# Patient Record
Sex: FEMALE | Race: WHITE | NOT HISPANIC OR LATINO
[De-identification: names, ages, dates, MRNs, and addresses within clinical notes are randomized per-mention and may not be internally consistent; named-entity substitution may affect disease eponyms.]

---

## 2020-01-21 PROBLEM — Z00.00 ENCOUNTER FOR PREVENTIVE HEALTH EXAMINATION: Status: ACTIVE | Noted: 2020-01-21

## 2020-02-04 ENCOUNTER — APPOINTMENT (OUTPATIENT)
Dept: PEDIATRIC RHEUMATOLOGY | Facility: CLINIC | Age: 20
End: 2020-02-04
Payer: COMMERCIAL

## 2020-02-04 VITALS
WEIGHT: 160.06 LBS | HEART RATE: 79 BPM | OXYGEN SATURATION: 98 % | BODY MASS INDEX: 23.98 KG/M2 | DIASTOLIC BLOOD PRESSURE: 79 MMHG | TEMPERATURE: 98.7 F | HEIGHT: 68.62 IN | SYSTOLIC BLOOD PRESSURE: 123 MMHG

## 2020-02-04 DIAGNOSIS — Z84.0 FAMILY HISTORY OF DISEASES OF THE SKIN AND SUBCUTANEOUS TISSUE: ICD-10-CM

## 2020-02-04 DIAGNOSIS — Z83.49 FAMILY HISTORY OF OTHER ENDOCRINE, NUTRITIONAL AND METABOLIC DISEASES: ICD-10-CM

## 2020-02-04 DIAGNOSIS — M25.50 PAIN IN UNSPECIFIED JOINT: ICD-10-CM

## 2020-02-04 DIAGNOSIS — Z82.61 FAMILY HISTORY OF ARTHRITIS: ICD-10-CM

## 2020-02-04 DIAGNOSIS — F39 UNSPECIFIED MOOD [AFFECTIVE] DISORDER: ICD-10-CM

## 2020-02-04 PROCEDURE — 99244 OFF/OP CNSLTJ NEW/EST MOD 40: CPT

## 2020-02-04 RX ORDER — MULTIVITAMIN
TABLET ORAL
Refills: 0 | Status: ACTIVE | COMMUNITY

## 2020-02-04 RX ORDER — NAPROXEN 500 MG/1
500 TABLET ORAL TWICE DAILY
Qty: 60 | Refills: 1 | Status: ACTIVE | COMMUNITY
Start: 2020-02-04 | End: 1900-01-01

## 2020-04-02 PROBLEM — Z84.0 FAMILY HISTORY OF PSORIASIS: Status: ACTIVE | Noted: 2020-04-02

## 2020-04-02 PROBLEM — F39 MOOD DISORDER: Status: ACTIVE | Noted: 2020-04-02

## 2020-04-02 PROBLEM — Z83.49 FAMILY HISTORY OF THYROID DISEASE: Status: ACTIVE | Noted: 2020-04-02

## 2020-04-02 PROBLEM — Z82.61 FAMILY HISTORY OF RHEUMATOID ARTHRITIS: Status: ACTIVE | Noted: 2020-04-02

## 2020-04-02 PROBLEM — M25.50 PAIN IN JOINT INVOLVING MULTIPLE SITES: Status: ACTIVE | Noted: 2020-02-04

## 2020-04-02 NOTE — PHYSICAL EXAM
[Normal] : normal [Cardiac Auscultation] : normal cardiac auscultation  [Auscultation] : lungs clear to auscultation [Grossly Intact] : grossly intact [FreeTextEntry1] : well-appearing [de-identified] : full mouth opening (crepitus on R), no evidence of arthritis, + some hypermobility (elbows hyperextensible)

## 2020-04-02 NOTE — DISCUSSION/SUMMARY
[FreeTextEntry1] : DIAGNOSIS\par \par 1) MULTIPLE JOINT PAIN\par Since 2014 \par Fingers, wrists, elbows, ankles, knees, hips, jaw - gets stuck open \par Worse during winter, usually worse with activity \par Fingers, elbows, ankles swell - 1-2x/wk, lasts a few hours\par Did PT in the past - didn't help  \par \par No evidence of arthritis on exam\par Pain likely exacerbated by poor sleep, stress\par \par PLAN\par 1. offered to order labs - Sosa wants to hold off (passes out)\par 2. start naproxen 500mg BID (instructed to take with food and avoid concurrent use of other NSAID's)\par -- recommend at least 2 week trial\par -- if no improvement noted after, can discontinue. if improvement noted, recommend taper to daily x 2 weeks, then discontinue.\par 3. reinforced the importance of good sleep hygiene\par 4. RTC as needed

## 2020-04-02 NOTE — REVIEW OF SYSTEMS
[Immunizations are up to date] : Immunizations are up to date [NI] : Endocrine [Nl] : Hematologic/Lymphatic [Wgt Gain (___ Lbs)] : recent [unfilled] lb weight gain [Skin Lesions] : skin lesions [Joint Pains] : arthralgias [Joint Swelling] : joint swelling  [Headache] : headache [FreeTextEntry1] : records maintained by [facility]

## 2020-04-02 NOTE — CONSULT LETTER
[Dear  ___] : Dear  [unfilled], [Consult Letter:] : I had the pleasure of evaluating your patient, [unfilled]. [Please see my note below.] : Please see my note below. [Consult Closing:] : Thank you very much for allowing me to participate in the care of this patient.  If you have any questions, please do not hesitate to contact me. [Sincerely,] : Sincerely, [FreeTextEntry2] : Dr. Diogo Delvalle\par [address], [address]\[place] [address]\par phone: [phone]\par fax: [phone] [FreeTextEntry3] : Melinda Cody MD \par [facility]

## 2020-04-02 NOTE — SOCIAL HISTORY
[Father] : father [College] : College [de-identified] : in [place] [FreeTextEntry1] : freshman at [employer] - studying adolescent psychology, mostly night classes

## 2020-04-02 NOTE — HISTORY OF PRESENT ILLNESS
[FreeTextEntry1] : 18 yo female referred by her PMD for joint pain. \par \par Since 2014. Joint pain travels, sometimes swell and bruise. Fingers, wrists, elbows, ankles, knees, hips, jaw - gets stuck open. Daily - no time preponderance, worse during winter. Usually worse with activity (i.e. bad knee pain day after hiking). Fingers, elbows, ankles swell - 1-2x/wk, lasts a few hours. + AM stiffness x 20-30 min (back). Tried heat/ice packs, compression sleeves, diets. Ibuprofen, Tylenol, Motrin don't really help. Did PT in the past - didn't help. Of note, works at a [employer].  \par Went to ED - labs reportedly normal including Lyme. \par \par + always tired, sleeps 4-5 hours/night, tries to take naps. + 20lb wt gain in 2 months (unsure if related to stress - started college), eats healthy. + random hives on legs pretty often, last 1 hour, itchy. + migraines - used to be bad, 1x/wk, no meds. No fevers, hair loss, oral ulcers, chest pain, n/v, abdominal pain, or Raynaud's.

## 2022-03-15 ENCOUNTER — RESULT REVIEW (OUTPATIENT)
Age: 22
End: 2022-03-15